# Patient Record
Sex: FEMALE | Race: WHITE | Employment: FULL TIME | URBAN - NONMETROPOLITAN AREA
[De-identification: names, ages, dates, MRNs, and addresses within clinical notes are randomized per-mention and may not be internally consistent; named-entity substitution may affect disease eponyms.]

---

## 2017-01-26 ENCOUNTER — OFFICE VISIT (OUTPATIENT)
Dept: FAMILY MEDICINE CLINIC | Age: 57
End: 2017-01-26

## 2017-01-26 ENCOUNTER — TELEPHONE (OUTPATIENT)
Dept: FAMILY MEDICINE CLINIC | Age: 57
End: 2017-01-26

## 2017-01-26 VITALS — BODY MASS INDEX: 32.52 KG/M2 | TEMPERATURE: 98.2 F | WEIGHT: 192.4 LBS

## 2017-01-26 DIAGNOSIS — J01.90 ACUTE RHINOSINUSITIS: Primary | ICD-10-CM

## 2017-01-26 PROCEDURE — 99213 OFFICE O/P EST LOW 20 MIN: CPT | Performed by: FAMILY MEDICINE

## 2017-01-26 RX ORDER — BENZONATATE 200 MG/1
200 CAPSULE ORAL 3 TIMES DAILY PRN
Qty: 30 CAPSULE | Refills: 0 | Status: SHIPPED | OUTPATIENT
Start: 2017-01-26 | End: 2017-02-05

## 2017-01-26 RX ORDER — AMOXICILLIN 500 MG/1
500 CAPSULE ORAL 2 TIMES DAILY
Qty: 20 CAPSULE | Refills: 0 | Status: SHIPPED | OUTPATIENT
Start: 2017-01-26 | End: 2017-12-05 | Stop reason: ALTCHOICE

## 2017-12-05 ENCOUNTER — OFFICE VISIT (OUTPATIENT)
Dept: FAMILY MEDICINE CLINIC | Age: 57
End: 2017-12-05

## 2017-12-05 ENCOUNTER — TELEPHONE (OUTPATIENT)
Dept: FAMILY MEDICINE CLINIC | Age: 57
End: 2017-12-05

## 2017-12-05 ENCOUNTER — HOSPITAL ENCOUNTER (OUTPATIENT)
Age: 57
Discharge: HOME | End: 2017-12-05

## 2017-12-05 VITALS
DIASTOLIC BLOOD PRESSURE: 86 MMHG | TEMPERATURE: 98 F | RESPIRATION RATE: 14 BRPM | HEIGHT: 64 IN | HEART RATE: 76 BPM | WEIGHT: 194.4 LBS | BODY MASS INDEX: 33.19 KG/M2 | SYSTOLIC BLOOD PRESSURE: 136 MMHG

## 2017-12-05 DIAGNOSIS — E04.9 THYROID ENLARGEMENT: Primary | ICD-10-CM

## 2017-12-05 DIAGNOSIS — R09.89 GLOBUS SENSATION: ICD-10-CM

## 2017-12-05 DIAGNOSIS — R53.83 OTHER FATIGUE: ICD-10-CM

## 2017-12-05 DIAGNOSIS — E04.9 THYROID ENLARGEMENT: ICD-10-CM

## 2017-12-05 LAB
T4 FREE: 0.83 NG/DL (ref 0.93–1.76)
TSH SERPL DL<=0.05 MIU/L-ACNC: 2.65 UIU/ML (ref 0.4–4.2)

## 2017-12-05 PROCEDURE — 84439 ASSAY OF FREE THYROXINE: CPT

## 2017-12-05 PROCEDURE — 36415 COLL VENOUS BLD VENIPUNCTURE: CPT

## 2017-12-05 PROCEDURE — 84443 ASSAY THYROID STIM HORMONE: CPT

## 2017-12-05 PROCEDURE — 99213 OFFICE O/P EST LOW 20 MIN: CPT | Performed by: FAMILY MEDICINE

## 2017-12-05 RX ORDER — RANITIDINE 150 MG/1
150 CAPSULE ORAL PRN
COMMUNITY

## 2017-12-05 ASSESSMENT — PATIENT HEALTH QUESTIONNAIRE - PHQ9
1. LITTLE INTEREST OR PLEASURE IN DOING THINGS: 0
2. FEELING DOWN, DEPRESSED OR HOPELESS: 0
SUM OF ALL RESPONSES TO PHQ QUESTIONS 1-9: 0
SUM OF ALL RESPONSES TO PHQ9 QUESTIONS 1 & 2: 0

## 2017-12-05 NOTE — TELEPHONE ENCOUNTER
Pt scheduled for US thyroid at [facility] on 12/12/17 at 8:30 am, pt to be in main radiology at 8:00 am.  Pt's , Dulce Maria Farmer, informed.   (on signed HIPAA)

## 2017-12-05 NOTE — PROGRESS NOTES
Visit Information    Have you changed or started any medications since your last visit including any over-the-counter medicines, vitamins, or herbal medicines? no   Are you having any side effects from any of your medications? -  no  Have you stopped taking any of your medications? Is so, why? -  no    Have you seen any other physician or provider since your last visit? No  Have you had any other diagnostic tests since your last visit? No  Have you been seen in the emergency room and/or had an admission to a hospital since we last saw you? No  Have you had your routine dental cleaning in the past 6 months? no    Have you activated your [employer] account? If not, what are your barriers?  No     Patient Care Team:  Negro Emanuel DO as PCP - General (Family Medicine)        Health Maintenance   Topic Date Due    Hepatitis C screen  1960    HIV screen  07/13/1976    DTaP/Tdap/Td vaccine (1 - Tdap) 07/13/1980    Cervical cancer screen  07/13/1982    Lipid screen  07/13/2001    Breast cancer screen  07/13/2011    Colon cancer screen colonoscopy  07/13/2011    Flu vaccine (1) 09/01/2017

## 2017-12-05 NOTE — PROGRESS NOTES
Clifford Murrieta is a 65 y.o. female that presents for Thyroid Problem (pt states her mom has a history of thyroid issues, pt noticed her neck swelling in the last month, thisis getting worse.) and Heartburn        HPI:    States that she is noting thyroid enlargement for the past 2 months. States that this comes and goes. Notes that she is having weight gain with this as well. Noting some hoarseness. States that she notes pressure in her throat as well. Notes some intermittent pain in the neck as well. Also noting heartburn over that time as well. She is taking zantac BID which does help. Notes that she is having some diffuse hair loss as well. She states that she feels like food is getting stuck in her chest at times. Can occur with any solids, does not occur with fluids. Noting more constipation. Mother with a history of hypothyroidism. Health Maintenance   Topic Date Due    Hepatitis C screen  1960    HIV screen  07/13/1976    DTaP/Tdap/Td vaccine (1 - Tdap) 07/13/1980    Cervical cancer screen  07/13/1982    Lipid screen  07/13/2001    Breast cancer screen  07/13/2011    Colon cancer screen colonoscopy  07/13/2011    Flu vaccine (1) 09/01/2017       History reviewed. No pertinent past medical history. Past Surgical History:   Procedure Laterality Date    CYST REMOVAL      cyst removal from hand        Social History   Substance Use Topics    Smoking status: Never Smoker    Smokeless tobacco: Never Used    Alcohol use No       Family History   Problem Relation Age of Onset    Diabetes Mother     High Blood Pressure Mother     Heart Failure Father     Depression Father     Heart Disease Father     High Blood Pressure Father     High Cholesterol Father     Stroke Father          I have reviewed the patient's past medical history, past surgical history, allergies, medications, social and family history and I have made updates where appropriate.     ROS        PHYSICAL EXAM:  /86   Pulse 76   Temp 98 °F (36.7 °C) (Oral)   Resp 14   Ht 5' 4.49\" (1.638 m)   Wt 194 lb 6.4 oz (88.2 kg)   BMI 32.87 kg/m²     General Appearance: well developed and well- nourished, in no acute distress  Head: normocephalic and atraumatic  ENT: external ear and ear canal normal bilaterally, nose without deformity, nasal mucosa and turbinates normal without polyps, oropharynx normal, dentition is normal for age, no lip or gum lesions noted  Neck: supple and non-tender without mass, appears to have diffuse thyroid enlargement and tenderness, no cervical lymphadenopathy  Pulmonary/Chest: clear to auscultation bilaterally- no wheezes, rales or rhonchi, normal air movement, no respiratory distress or retractions  Cardiovascular: normal rate, regular rhythm, normal S1 and S2, no murmurs, rubs, clicks, or gallops, distal pulses intact  Abdomen: soft, non-tender, non-distended, no rebound or guarding, no masses or hernias noted, no hepatospleenomegaly  Extremities: no cyanosis, clubbing or edema of the lower extremities  Psych:  Normal affect without evidence of depression or anxiety, insight and judgement are appropriate, memory appears intact  Skin: warm and dry, no rash or erythema      ASSESSMENT & PLAN  Lanette was seen today for thyroid problem and heartburn. Diagnoses and all orders for this visit:    Thyroid enlargement  -     US THYROID; Future  -     TSH without Reflex; Future  -     T4, Free; Future    Other fatigue  -     US THYROID; Future  -     TSH without Reflex; Future  -     T4, Free; Future    Globus sensation  -     US THYROID; Future  -     TSH without Reflex; Future  -     T4, Free; Future    Other orders  -     Cancel: TSH with Reflex; Future    -Suspect hypothyroidism with possible goiter, will obtain labs and obtain US, will call with results  -Globus sensation sounds like a stricture.   Given that she does not have insurance, she wants to hold off on GI referral and work on

## 2017-12-12 ENCOUNTER — HOSPITAL ENCOUNTER (OUTPATIENT)
Dept: ULTRASOUND IMAGING | Age: 57
Discharge: HOME | End: 2017-12-12

## 2017-12-12 DIAGNOSIS — E04.9 THYROID ENLARGEMENT: ICD-10-CM

## 2017-12-12 DIAGNOSIS — R53.83 OTHER FATIGUE: ICD-10-CM

## 2017-12-12 DIAGNOSIS — R09.89 GLOBUS SENSATION: ICD-10-CM

## 2017-12-12 PROCEDURE — 76536 US EXAM OF HEAD AND NECK: CPT

## 2017-12-13 ENCOUNTER — TELEPHONE (OUTPATIENT)
Dept: FAMILY MEDICINE CLINIC | Age: 57
End: 2017-12-13

## 2017-12-13 DIAGNOSIS — R79.89 LOW SERUM T4 LEVEL: ICD-10-CM

## 2017-12-13 DIAGNOSIS — E04.9 GOITER, NODULAR: Primary | ICD-10-CM

## 2017-12-13 RX ORDER — LEVOTHYROXINE SODIUM 0.03 MG/1
25 TABLET ORAL DAILY
Qty: 30 TABLET | Refills: 3 | Status: SHIPPED | OUTPATIENT
Start: 2017-12-13 | End: 2018-01-11 | Stop reason: SDUPTHER

## 2018-01-09 ENCOUNTER — HOSPITAL ENCOUNTER (OUTPATIENT)
Age: 58
Discharge: HOME | End: 2018-01-09

## 2018-01-09 ENCOUNTER — TELEPHONE (OUTPATIENT)
Dept: FAMILY MEDICINE CLINIC | Age: 58
End: 2018-01-09

## 2018-01-09 DIAGNOSIS — R79.89 LOW SERUM T4 LEVEL: ICD-10-CM

## 2018-01-09 DIAGNOSIS — E04.9 GOITER, NODULAR: ICD-10-CM

## 2018-01-09 LAB
T4 FREE: 1 NG/DL (ref 0.93–1.76)
TSH SERPL DL<=0.05 MIU/L-ACNC: 1.77 UIU/ML (ref 0.4–4.2)

## 2018-01-09 PROCEDURE — 84439 ASSAY OF FREE THYROXINE: CPT

## 2018-01-09 PROCEDURE — 84443 ASSAY THYROID STIM HORMONE: CPT

## 2018-01-09 PROCEDURE — 36415 COLL VENOUS BLD VENIPUNCTURE: CPT

## 2018-01-11 DIAGNOSIS — E04.9 GOITER, NODULAR: ICD-10-CM

## 2018-01-11 DIAGNOSIS — R79.89 LOW SERUM T4 LEVEL: ICD-10-CM

## 2018-01-11 RX ORDER — LEVOTHYROXINE SODIUM 0.03 MG/1
25 TABLET ORAL DAILY
Qty: 90 TABLET | Refills: 3 | Status: SHIPPED | OUTPATIENT
Start: 2018-01-11 | End: 2019-07-09

## 2018-01-16 ENCOUNTER — OFFICE VISIT (OUTPATIENT)
Dept: FAMILY MEDICINE CLINIC | Age: 58
End: 2018-01-16

## 2018-01-16 VITALS
TEMPERATURE: 98.5 F | DIASTOLIC BLOOD PRESSURE: 76 MMHG | WEIGHT: 190 LBS | BODY MASS INDEX: 31.65 KG/M2 | HEART RATE: 74 BPM | SYSTOLIC BLOOD PRESSURE: 130 MMHG | RESPIRATION RATE: 18 BRPM | HEIGHT: 65 IN

## 2018-01-16 DIAGNOSIS — R05.9 COUGH: ICD-10-CM

## 2018-01-16 DIAGNOSIS — J10.1 INFLUENZA A: Primary | ICD-10-CM

## 2018-01-16 DIAGNOSIS — R52 BODY ACHES: ICD-10-CM

## 2018-01-16 LAB
INFLUENZA A ANTIBODY: POSITIVE
INFLUENZA B ANTIBODY: NEGATIVE

## 2018-01-16 PROCEDURE — 87804 INFLUENZA ASSAY W/OPTIC: CPT | Performed by: FAMILY MEDICINE

## 2018-01-16 PROCEDURE — 99213 OFFICE O/P EST LOW 20 MIN: CPT | Performed by: FAMILY MEDICINE

## 2018-01-16 RX ORDER — BENZONATATE 200 MG/1
200 CAPSULE ORAL 3 TIMES DAILY PRN
Qty: 30 CAPSULE | Refills: 0 | Status: SHIPPED | OUTPATIENT
Start: 2018-01-16 | End: 2018-01-26

## 2018-01-16 NOTE — PROGRESS NOTES
Visit Information    Have you changed or started any medications since your last visit including any over-the-counter medicines, vitamins, or herbal medicines? no   Are you having any side effects from any of your medications? -  no  Have you stopped taking any of your medications? Is so, why? -  no    Have you seen any other physician or provider since your last visit? No  Have you had any other diagnostic tests since your last visit? No  Have you been seen in the emergency room and/or had an admission to a hospital since we last saw you? No  Have you had your routine dental cleaning in the past 6 months? no    Have you activated your [employer] account? If not, what are your barriers?  Yes     Patient Care Team:  Wili Brush DO as PCP - General (Family Medicine)        Health Maintenance   Topic Date Due    Hepatitis C screen  1960    HIV screen  07/13/1976    DTaP/Tdap/Td vaccine (1 - Tdap) 07/13/1980    Cervical cancer screen  07/13/1982    Lipid screen  07/13/2001    Diabetes screen  07/13/2001    Breast cancer screen  07/13/2011    Colon cancer screen colonoscopy  07/13/2011    Flu vaccine (1) 09/01/2017

## 2018-02-13 ENCOUNTER — OFFICE VISIT (OUTPATIENT)
Dept: FAMILY MEDICINE CLINIC | Age: 58
End: 2018-02-13

## 2018-02-13 VITALS
HEART RATE: 80 BPM | HEIGHT: 65 IN | SYSTOLIC BLOOD PRESSURE: 116 MMHG | RESPIRATION RATE: 24 BRPM | BODY MASS INDEX: 31.32 KG/M2 | WEIGHT: 188 LBS | TEMPERATURE: 98 F | DIASTOLIC BLOOD PRESSURE: 70 MMHG

## 2018-02-13 DIAGNOSIS — R05.8 POST-VIRAL COUGH SYNDROME: Primary | ICD-10-CM

## 2018-02-13 PROCEDURE — 99213 OFFICE O/P EST LOW 20 MIN: CPT | Performed by: FAMILY MEDICINE

## 2018-02-13 RX ORDER — GUAIFENESIN AND CODEINE PHOSPHATE 100; 10 MG/5ML; MG/5ML
5 SOLUTION ORAL EVERY 4 HOURS PRN
Qty: 200 ML | Refills: 0 | Status: SHIPPED | OUTPATIENT
Start: 2018-02-13 | End: 2018-02-20

## 2018-02-13 RX ORDER — PREDNISONE 20 MG/1
20 TABLET ORAL DAILY
Qty: 5 TABLET | Refills: 0 | Status: SHIPPED | OUTPATIENT
Start: 2018-02-13 | End: 2018-02-18

## 2018-02-13 NOTE — PROGRESS NOTES
Visit Information    Have you changed or started any medications since your last visit including any over-the-counter medicines, vitamins, or herbal medicines? no   Are you having any side effects from any of your medications? -  no  Have you stopped taking any of your medications? Is so, why? -  no    Have you seen any other physician or provider since your last visit? No  Have you had any other diagnostic tests since your last visit? No  Have you been seen in the emergency room and/or had an admission to a hospital since we last saw you? No  Have you had your routine dental cleaning in the past 6 months? no    Have you activated your [employer] account? If not, what are your barriers?  Yes     Patient Care Team:  Kenyon Angel, DO as PCP - General (Family Medicine)        Health Maintenance   Topic Date Due    Hepatitis C screen  1960    HIV screen  07/13/1976    DTaP/Tdap/Td vaccine (1 - Tdap) 07/13/1980    Cervical cancer screen  07/13/1982    Lipid screen  07/13/2001    Diabetes screen  07/13/2001    Breast cancer screen  07/13/2011    Colon cancer screen colonoscopy  07/13/2011    Flu vaccine (1) 09/01/2017

## 2018-06-11 ENCOUNTER — TELEPHONE (OUTPATIENT)
Dept: FAMILY MEDICINE CLINIC | Age: 58
End: 2018-06-11

## 2018-06-11 DIAGNOSIS — E04.2 MULTINODULAR GOITER: Primary | ICD-10-CM

## 2018-06-26 ENCOUNTER — HOSPITAL ENCOUNTER (OUTPATIENT)
Dept: ULTRASOUND IMAGING | Age: 58
Discharge: HOME | End: 2018-06-26

## 2018-06-26 ENCOUNTER — TELEPHONE (OUTPATIENT)
Dept: FAMILY MEDICINE CLINIC | Age: 58
End: 2018-06-26

## 2018-06-26 DIAGNOSIS — E04.2 MULTINODULAR GOITER: ICD-10-CM

## 2018-06-26 DIAGNOSIS — E04.1 THYROID NODULE: Primary | ICD-10-CM

## 2018-06-26 PROCEDURE — 76536 US EXAM OF HEAD AND NECK: CPT

## 2018-06-27 ENCOUNTER — TELEPHONE (OUTPATIENT)
Dept: FAMILY MEDICINE CLINIC | Age: 58
End: 2018-06-27

## 2018-06-28 ENCOUNTER — TELEPHONE (OUTPATIENT)
Dept: FAMILY MEDICINE CLINIC | Age: 58
End: 2018-06-28

## 2018-07-19 ENCOUNTER — OFFICE VISIT (OUTPATIENT)
Dept: FAMILY MEDICINE CLINIC | Age: 58
End: 2018-07-19

## 2018-07-19 VITALS
WEIGHT: 186 LBS | DIASTOLIC BLOOD PRESSURE: 84 MMHG | HEART RATE: 66 BPM | HEIGHT: 64 IN | SYSTOLIC BLOOD PRESSURE: 136 MMHG | BODY MASS INDEX: 31.76 KG/M2 | RESPIRATION RATE: 18 BRPM | TEMPERATURE: 99.5 F

## 2018-07-19 DIAGNOSIS — J01.90 ACUTE RHINOSINUSITIS: Primary | ICD-10-CM

## 2018-07-19 PROCEDURE — 99213 OFFICE O/P EST LOW 20 MIN: CPT | Performed by: FAMILY MEDICINE

## 2018-07-19 RX ORDER — DOXYCYCLINE HYCLATE 100 MG/1
100 CAPSULE ORAL 2 TIMES DAILY
Qty: 20 CAPSULE | Refills: 0 | Status: SHIPPED | OUTPATIENT
Start: 2018-07-19 | End: 2018-07-29

## 2018-07-19 RX ORDER — BENZONATATE 100 MG/1
CAPSULE ORAL
Qty: 60 CAPSULE | Refills: 0 | Status: SHIPPED | OUTPATIENT
Start: 2018-07-19 | End: 2019-03-08 | Stop reason: SDUPTHER

## 2018-07-19 RX ORDER — FLUTICASONE PROPIONATE 50 MCG
1 SPRAY, SUSPENSION (ML) NASAL DAILY
Qty: 1 BOTTLE | Refills: 0 | Status: SHIPPED | OUTPATIENT
Start: 2018-07-19 | End: 2018-10-12

## 2018-07-19 NOTE — PATIENT INSTRUCTIONS
You may receive a survey about your visit with us today. The feedback from our patients helps us identify what is working well and where the service to all patients can be enhanced. Thank you! Patient Education        Sinusitis: Care Instructions  Your Care Instructions    Sinusitis is an infection of the lining of the sinus cavities in your head. Sinusitis often follows a cold. It causes pain and pressure in your head and face. In most cases, sinusitis gets better on its own in 1 to 2 weeks. But some mild symptoms may last for several weeks. Sometimes antibiotics are needed. Follow-up care is a key part of your treatment and safety. Be sure to make and go to all appointments, and call your doctor if you are having problems. It's also a good idea to know your test results and keep a list of the medicines you take. How can you care for yourself at home? · Take an over-the-counter pain medicine, such as acetaminophen (Tylenol), ibuprofen (Advil, Motrin), or naproxen (Aleve). Read and follow all instructions on the label. · If the doctor prescribed antibiotics, take them as directed. Do not stop taking them just because you feel better. You need to take the full course of antibiotics. · Be careful when taking over-the-counter cold or flu medicines and Tylenol at the same time. Many of these medicines have acetaminophen, which is Tylenol. Read the labels to make sure that you are not taking more than the recommended dose. Too much acetaminophen (Tylenol) can be harmful. · Breathe warm, moist air from a steamy shower, a hot bath, or a sink filled with hot water. Avoid cold, dry air. Using a humidifier in your home may help. Follow the directions for cleaning the machine. · Use saline (saltwater) nasal washes to help keep your nasal passages open and wash out mucus and bacteria. You can buy saline nose drops at a grocery store or drugstore.  Or you can make your own at home by adding 1 teaspoon of salt and 1 teaspoon of baking soda to 2 cups of distilled water. If you make your own, fill a bulb syringe with the solution, insert the tip into your nostril, and squeeze gently. [place] Saver your nose. · Put a hot, wet towel or a warm gel pack on your face 3 or 4 times a day for 5 to 10 minutes each time. · Try a decongestant nasal spray like oxymetazoline (Afrin). Do not use it for more than 3 days in a row. Using it for more than 3 days can make your congestion worse. When should you call for help? Call your doctor now or seek immediate medical care if:    · You have new or worse swelling or redness in your face or around your eyes.     · You have a new or higher fever.    Watch closely for changes in your health, and be sure to contact your doctor if:    · You have new or worse facial pain.     · The mucus from your nose becomes thicker (like pus) or has new blood in it.     · You are not getting better as expected. Where can you learn more? Go to https://Picklive.Aula 7. org and sign in to your [facility] account. Enter P639 in the Hydrostor box to learn more about \"Sinusitis: Care Instructions. \"     If you do not have an account, please click on the \"Sign Up Now\" link. Current as of: May 12, 2017  Content Version: 11.6  © 8044-7357 [employer]. Care instructions adapted under license by [facility] ([facility]). If you have questions about a medical condition or this instruction, always ask your healthcare professional. [place] [address] any warranty or liability for your use of this information.

## 2018-07-19 NOTE — PROGRESS NOTES
Chief Complaint   Patient presents with    Cough    Other     lost voice  x 2 days       History obtained from the patient. SUBJECTIVE:  Devendra Duran is a 63 y.o. female that presents today for      URI type sxs: 7 days of cough, congestion, cough. No fevers. No SOB. Fever - No  Runny nose or congestion -  Yes   Cough -  Yes  Sore throat -  Yes  Headache, fatigue, joint pains, muscle aches -  No  Double Sickening - Yes  Shortness of breath/Wheezing? -  No  Nausea/Vomiting/Diarrhea? No  Sick contacts - Yes  Maxillary Tooth Pain -  Yes  Treatment tried and response - otc meds, no better        Current Outpatient Prescriptions   Medication Sig Dispense Refill    doxycycline hyclate (VIBRAMYCIN) 100 MG capsule Take 1 capsule by mouth 2 times daily for 10 days 20 capsule 0    benzonatate (TESSALON PERLES) 100 MG capsule Take 1 to 2 tablets by mouth every 8 hours as needed for cough. 60 capsule 0    fluticasone (FLONASE) 50 MCG/ACT nasal spray 1 spray by Nasal route daily for 14 days 1 Bottle 0    levothyroxine (SYNTHROID) 25 MCG tablet Take 1 tablet by mouth Daily 90 tablet 3    ranitidine (ZANTAC) 150 MG capsule Take 150 mg by mouth 2 times daily       No current facility-administered medications for this visit. Orders Placed This Encounter   Medications    doxycycline hyclate (VIBRAMYCIN) 100 MG capsule     Sig: Take 1 capsule by mouth 2 times daily for 10 days     Dispense:  20 capsule     Refill:  0    benzonatate (TESSALON PERLES) 100 MG capsule     Sig: Take 1 to 2 tablets by mouth every 8 hours as needed for cough. Dispense:  60 capsule     Refill:  0    fluticasone (FLONASE) 50 MCG/ACT nasal spray     Si spray by Nasal route daily for 14 days     Dispense:  1 Bottle     Refill:  0         All medications reviewed and reconciled, including OTC and herbal medications. Updated list given to patient.        Patient Active Problem List    Diagnosis Date Noted    Goiter, nodular

## 2018-08-10 ENCOUNTER — HOSPITAL ENCOUNTER (OUTPATIENT)
Dept: ULTRASOUND IMAGING | Age: 58
Discharge: HOME | End: 2018-08-10

## 2018-08-10 DIAGNOSIS — E04.1 THYROID NODULE: ICD-10-CM

## 2018-08-10 PROCEDURE — 76942 ECHO GUIDE FOR BIOPSY: CPT

## 2018-08-10 PROCEDURE — 60100 BIOPSY OF THYROID: CPT

## 2018-08-10 PROCEDURE — 88173 CYTOPATH EVAL FNA REPORT: CPT

## 2018-08-10 PROCEDURE — 88172 CYTP DX EVAL FNA 1ST EA SITE: CPT

## 2018-08-10 NOTE — PROGRESS NOTES
Formulation and discussion of sedation / procedure plans, risks, benefits, side effects and alternatives with patient and/or responsible adult completed.     Electronically signed by Alana Carrillo MD on 8/10/2018 at 3:07 PM

## 2018-08-14 ENCOUNTER — TELEPHONE (OUTPATIENT)
Dept: FAMILY MEDICINE CLINIC | Age: 58
End: 2018-08-14

## 2018-08-14 DIAGNOSIS — E06.3 LYMPHOCYTIC THYROIDITIS: Primary | ICD-10-CM

## 2018-08-14 NOTE — TELEPHONE ENCOUNTER
Lanette called for her biopsy results. She is getting ready to leave for work soon so if she is not available she asked that you call her home phone # and talk to her  Laron Jj.   [phone]

## 2018-10-12 ENCOUNTER — HOSPITAL ENCOUNTER (OUTPATIENT)
Dept: NURSING | Age: 58
Discharge: HOME | End: 2018-10-12

## 2018-10-12 VITALS
RESPIRATION RATE: 16 BRPM | TEMPERATURE: 97.9 F | SYSTOLIC BLOOD PRESSURE: 129 MMHG | DIASTOLIC BLOOD PRESSURE: 78 MMHG | HEART RATE: 60 BPM | BODY MASS INDEX: 31.93 KG/M2 | WEIGHT: 186 LBS

## 2018-10-12 LAB
ALBUMIN SERPL-MCNC: 4.3 G/DL (ref 3.5–5.1)
ALP BLD-CCNC: 106 U/L (ref 38–126)
ALT SERPL-CCNC: 9 U/L (ref 11–66)
ANION GAP SERPL CALCULATED.3IONS-SCNC: 15 MEQ/L (ref 8–16)
AST SERPL-CCNC: 17 U/L (ref 5–40)
AVERAGE GLUCOSE: 105 MG/DL (ref 70–126)
BASOPHILS # BLD: 1.4 %
BASOPHILS ABSOLUTE: 0.1 THOU/MM3 (ref 0–0.1)
BILIRUB SERPL-MCNC: 0.2 MG/DL (ref 0.3–1.2)
BUN BLDV-MCNC: 10 MG/DL (ref 7–22)
CALCIUM SERPL-MCNC: 9.4 MG/DL (ref 8.5–10.5)
CHLORIDE BLD-SCNC: 104 MEQ/L (ref 98–111)
CO2: 22 MEQ/L (ref 23–33)
CORTISOL COLLECTION INFO: NORMAL
CORTISOL: 11.28 UG/DL
CORTISOL: 20.04 UG/DL
CORTISOL: 20.48 UG/DL
CREAT SERPL-MCNC: 0.8 MG/DL (ref 0.4–1.2)
EOSINOPHIL # BLD: 4.8 %
EOSINOPHILS ABSOLUTE: 0.2 THOU/MM3 (ref 0–0.4)
ERYTHROCYTE [DISTWIDTH] IN BLOOD BY AUTOMATED COUNT: 15.9 % (ref 11.5–14.5)
ERYTHROCYTE [DISTWIDTH] IN BLOOD BY AUTOMATED COUNT: 43.1 FL (ref 35–45)
FOLLICLE STIMULATING HORMONE: 39 MIU/ML (ref 16–160)
GFR SERPL CREATININE-BSD FRML MDRD: 74 ML/MIN/1.73M2
GLUCOSE BLD-MCNC: 95 MG/DL (ref 70–108)
HBA1C MFR BLD: 5.5 % (ref 4.4–6.4)
HCT VFR BLD CALC: 32.6 % (ref 37–47)
HEMOGLOBIN: 10.2 GM/DL (ref 12–16)
IMMATURE GRANS (ABS): 0.01 THOU/MM3 (ref 0–0.07)
IMMATURE GRANULOCYTES: 0.2 %
LUTEINIZING HORMONE: 50.9 MIU/ML (ref 3.3–70.6)
LYMPHOCYTES # BLD: 35.3 %
LYMPHOCYTES ABSOLUTE: 1.8 THOU/MM3 (ref 1–4.8)
MCH RBC QN AUTO: 23.4 PG (ref 26–33)
MCHC RBC AUTO-ENTMCNC: 31.3 GM/DL (ref 32.2–35.5)
MCV RBC AUTO: 74.8 FL (ref 81–99)
MONOCYTES # BLD: 14.9 %
MONOCYTES ABSOLUTE: 0.8 THOU/MM3 (ref 0.4–1.3)
NUCLEATED RED BLOOD CELLS: 0 /100 WBC
PLATELET # BLD: 326 THOU/MM3 (ref 130–400)
PMV BLD AUTO: 8.6 FL (ref 9.4–12.4)
POTASSIUM SERPL-SCNC: 4.4 MEQ/L (ref 3.5–5.2)
PROLACTIN: 11 NG/ML
RBC # BLD: 4.36 MILL/MM3 (ref 4.2–5.4)
SEG NEUTROPHILS: 43.4 %
SEGMENTED NEUTROPHILS ABSOLUTE COUNT: 2.3 THOU/MM3 (ref 1.8–7.7)
SODIUM BLD-SCNC: 141 MEQ/L (ref 135–145)
TOTAL PROTEIN: 7.1 G/DL (ref 6.1–8)
TSH SERPL DL<=0.05 MIU/L-ACNC: 3.8 UIU/ML (ref 0.4–4.2)
VITAMIN B-12: 177 PG/ML (ref 211–911)
VITAMIN D 25-HYDROXY: 10 NG/ML (ref 30–100)
WBC # BLD: 5.2 THOU/MM3 (ref 4.8–10.8)

## 2018-10-12 PROCEDURE — 82607 VITAMIN B-12: CPT

## 2018-10-12 PROCEDURE — 84146 ASSAY OF PROLACTIN: CPT

## 2018-10-12 PROCEDURE — 83002 ASSAY OF GONADOTROPIN (LH): CPT

## 2018-10-12 PROCEDURE — 82533 TOTAL CORTISOL: CPT

## 2018-10-12 PROCEDURE — 83001 ASSAY OF GONADOTROPIN (FSH): CPT

## 2018-10-12 PROCEDURE — 84443 ASSAY THYROID STIM HORMONE: CPT

## 2018-10-12 PROCEDURE — 80053 COMPREHEN METABOLIC PANEL: CPT

## 2018-10-12 PROCEDURE — 36415 COLL VENOUS BLD VENIPUNCTURE: CPT

## 2018-10-12 PROCEDURE — 6360000002 HC RX W HCPCS: Performed by: INTERNAL MEDICINE

## 2018-10-12 PROCEDURE — 2580000003 HC RX 258: Performed by: INTERNAL MEDICINE

## 2018-10-12 PROCEDURE — 2709999900 HC NON-CHARGEABLE SUPPLY

## 2018-10-12 PROCEDURE — 84305 ASSAY OF SOMATOMEDIN: CPT

## 2018-10-12 PROCEDURE — G0463 HOSPITAL OUTPT CLINIC VISIT: HCPCS

## 2018-10-12 PROCEDURE — 96374 THER/PROPH/DIAG INJ IV PUSH: CPT

## 2018-10-12 PROCEDURE — 82306 VITAMIN D 25 HYDROXY: CPT

## 2018-10-12 PROCEDURE — 85025 COMPLETE CBC W/AUTO DIFF WBC: CPT

## 2018-10-12 PROCEDURE — 82024 ASSAY OF ACTH: CPT

## 2018-10-12 PROCEDURE — 83036 HEMOGLOBIN GLYCOSYLATED A1C: CPT

## 2018-10-12 RX ORDER — COSYNTROPIN 0.25 MG/ML
250 INJECTION, POWDER, FOR SOLUTION INTRAMUSCULAR; INTRAVENOUS ONCE
Status: COMPLETED | OUTPATIENT
Start: 2018-10-12 | End: 2018-10-12

## 2018-10-12 RX ORDER — SODIUM CHLORIDE 0.9 % (FLUSH) 0.9 %
10 SYRINGE (ML) INJECTION PRN
Status: DISCONTINUED | OUTPATIENT
Start: 2018-10-12 | End: 2018-10-13 | Stop reason: HOSPADM

## 2018-10-12 RX ADMIN — COSYNTROPIN 250 MCG: 0.25 INJECTION, POWDER, LYOPHILIZED, FOR SOLUTION INTRAMUSCULAR; INTRAVENOUS at 08:06

## 2018-10-12 RX ADMIN — Medication 10 ML: at 08:36

## 2018-10-12 RX ADMIN — Medication 10 ML: at 09:08

## 2018-10-12 RX ADMIN — Medication 10 ML: at 08:03

## 2018-10-12 ASSESSMENT — PAIN - FUNCTIONAL ASSESSMENT: PAIN_FUNCTIONAL_ASSESSMENT: 0-10

## 2018-10-12 NOTE — PROGRESS NOTES
0730 Patient arrived to [facility] ambulatory for cortrosyn stimulation test with  at side  PT RIGHTS AND RESPONSIBILITIES OFFERED TO PT.  5170 Test explained to patient and  along with education of medication and lab work. 8314 Lab collected and sent to lab as ordered. 0825 Lab collected 30 minutes post injection. 4435 Lab work 60 min post injection collected and sent to lab as ordered  [phone] Discharge instructions given to patient verbalized understanding. Patient discharged to home in stable condition.     _m___ Safety:       (Environmental)   [place] to environment   Ensure ID band is correct and in place/ allergy band as needed   Assess for fall risk   Initiate fall precautions as applicable (fall band, side rails, etc.)   Call light within reach   Bed in low position/ wheels locked    _m___ Pain:        Assess pain level and characteristics   Administer analgesics as ordered   Assess effectiveness of pain management and report to MD as needed    _m___ Knowledge Deficit:   Assess baseline knowledge   Provide teaching at level of understanding   Provide teaching via preferred learning method   Evaluate teaching effectiveness  m  ____ Hemodynamic/Respiratory Status:       (Pre and Post Procedure Monitoring)   Assess/Monitor vital signs and LOC   Assess Baseline SpO2 prior to any sedation   Obtain weight/height   Assess vital signs/ LOC until patient meets discharge criteria   Monitor procedure site and notify MD of any issues

## 2018-10-14 LAB
ACTH: 75 PG/ML (ref 6–58)
SOMATOMEDIN IGF 1 Z-SCORE: 1
SOMATOMEDIN: 175 NG/ML (ref 47–236)

## 2019-01-22 ENCOUNTER — OFFICE VISIT (OUTPATIENT)
Dept: FAMILY MEDICINE CLINIC | Age: 59
End: 2019-01-22

## 2019-01-22 VITALS
HEART RATE: 88 BPM | HEIGHT: 66 IN | RESPIRATION RATE: 18 BRPM | TEMPERATURE: 98.3 F | WEIGHT: 185.4 LBS | SYSTOLIC BLOOD PRESSURE: 152 MMHG | DIASTOLIC BLOOD PRESSURE: 94 MMHG | BODY MASS INDEX: 29.8 KG/M2

## 2019-01-22 DIAGNOSIS — E04.9 GOITER, NODULAR: ICD-10-CM

## 2019-01-22 DIAGNOSIS — E55.9 VITAMIN D DEFICIENCY: ICD-10-CM

## 2019-01-22 DIAGNOSIS — E03.8 OTHER SPECIFIED HYPOTHYROIDISM: Primary | ICD-10-CM

## 2019-01-22 DIAGNOSIS — E53.9 VITAMIN B DEFICIENCY: ICD-10-CM

## 2019-01-22 PROCEDURE — 99213 OFFICE O/P EST LOW 20 MIN: CPT | Performed by: FAMILY MEDICINE

## 2019-01-22 RX ORDER — ERGOCALCIFEROL 1.25 MG/1
CAPSULE ORAL
Refills: 0 | COMMUNITY
Start: 2018-11-06 | End: 2019-01-29

## 2019-01-22 ASSESSMENT — PATIENT HEALTH QUESTIONNAIRE - PHQ9
SUM OF ALL RESPONSES TO PHQ9 QUESTIONS 1 & 2: 1
2. FEELING DOWN, DEPRESSED OR HOPELESS: 1
SUM OF ALL RESPONSES TO PHQ QUESTIONS 1-9: 1
SUM OF ALL RESPONSES TO PHQ QUESTIONS 1-9: 1
1. LITTLE INTEREST OR PLEASURE IN DOING THINGS: 0

## 2019-01-29 ENCOUNTER — HOSPITAL ENCOUNTER (OUTPATIENT)
Age: 59
Discharge: HOME | End: 2019-01-29

## 2019-01-29 ENCOUNTER — TELEPHONE (OUTPATIENT)
Dept: FAMILY MEDICINE CLINIC | Age: 59
End: 2019-01-29

## 2019-01-29 DIAGNOSIS — E55.9 VITAMIN D DEFICIENCY: ICD-10-CM

## 2019-01-29 DIAGNOSIS — E53.9 VITAMIN B DEFICIENCY: ICD-10-CM

## 2019-01-29 LAB
VITAMIN B-12: 1987 PG/ML (ref 211–911)
VITAMIN D 25-HYDROXY: 32 NG/ML (ref 30–100)

## 2019-01-29 PROCEDURE — 82607 VITAMIN B-12: CPT

## 2019-01-29 PROCEDURE — 36415 COLL VENOUS BLD VENIPUNCTURE: CPT

## 2019-01-29 PROCEDURE — 82306 VITAMIN D 25 HYDROXY: CPT

## 2019-03-08 ENCOUNTER — TELEPHONE (OUTPATIENT)
Dept: FAMILY MEDICINE CLINIC | Age: 59
End: 2019-03-08

## 2019-03-08 ENCOUNTER — OFFICE VISIT (OUTPATIENT)
Dept: FAMILY MEDICINE CLINIC | Age: 59
End: 2019-03-08

## 2019-03-08 VITALS
BODY MASS INDEX: 29.82 KG/M2 | HEART RATE: 88 BPM | HEIGHT: 65 IN | SYSTOLIC BLOOD PRESSURE: 128 MMHG | RESPIRATION RATE: 14 BRPM | TEMPERATURE: 98.2 F | WEIGHT: 179 LBS | DIASTOLIC BLOOD PRESSURE: 64 MMHG

## 2019-03-08 DIAGNOSIS — J06.9 UPPER RESPIRATORY TRACT INFECTION, UNSPECIFIED TYPE: Primary | ICD-10-CM

## 2019-03-08 PROCEDURE — 99213 OFFICE O/P EST LOW 20 MIN: CPT | Performed by: FAMILY MEDICINE

## 2019-03-08 RX ORDER — BENZONATATE 100 MG/1
CAPSULE ORAL
Qty: 60 CAPSULE | Refills: 0 | Status: SHIPPED | OUTPATIENT
Start: 2019-03-08 | End: 2019-03-18

## 2019-03-08 RX ORDER — OSELTAMIVIR PHOSPHATE 75 MG/1
75 CAPSULE ORAL 2 TIMES DAILY
Qty: 10 CAPSULE | Refills: 0 | Status: SHIPPED | OUTPATIENT
Start: 2019-03-08 | End: 2019-03-13

## 2019-07-09 ENCOUNTER — NURSE ONLY (OUTPATIENT)
Dept: LAB | Age: 59
End: 2019-07-09

## 2019-07-09 ENCOUNTER — OFFICE VISIT (OUTPATIENT)
Dept: FAMILY MEDICINE CLINIC | Age: 59
End: 2019-07-09

## 2019-07-09 ENCOUNTER — TELEPHONE (OUTPATIENT)
Dept: FAMILY MEDICINE CLINIC | Age: 59
End: 2019-07-09

## 2019-07-09 VITALS
HEIGHT: 64 IN | HEART RATE: 84 BPM | WEIGHT: 177 LBS | TEMPERATURE: 98.3 F | BODY MASS INDEX: 30.22 KG/M2 | DIASTOLIC BLOOD PRESSURE: 80 MMHG | SYSTOLIC BLOOD PRESSURE: 136 MMHG | RESPIRATION RATE: 14 BRPM

## 2019-07-09 DIAGNOSIS — E04.9 GOITER, NODULAR: ICD-10-CM

## 2019-07-09 DIAGNOSIS — E03.8 OTHER SPECIFIED HYPOTHYROIDISM: Primary | ICD-10-CM

## 2019-07-09 DIAGNOSIS — E55.9 VITAMIN D DEFICIENCY: Primary | ICD-10-CM

## 2019-07-09 DIAGNOSIS — E55.9 VITAMIN D DEFICIENCY: ICD-10-CM

## 2019-07-09 DIAGNOSIS — R79.89 LOW SERUM T4 LEVEL: ICD-10-CM

## 2019-07-09 DIAGNOSIS — E03.8 OTHER SPECIFIED HYPOTHYROIDISM: ICD-10-CM

## 2019-07-09 LAB
T4 FREE: 0.97 NG/DL (ref 0.93–1.76)
TSH SERPL DL<=0.05 MIU/L-ACNC: 3.11 UIU/ML (ref 0.4–4.2)
VITAMIN D 25-HYDROXY: 22 NG/ML (ref 30–100)

## 2019-07-09 PROCEDURE — 99213 OFFICE O/P EST LOW 20 MIN: CPT | Performed by: FAMILY MEDICINE

## 2019-07-09 RX ORDER — CHOLECALCIFEROL (VITAMIN D3) 50 MCG
2000 TABLET ORAL DAILY
Qty: 30 TABLET | Refills: 5 | Status: SHIPPED | OUTPATIENT
Start: 2019-07-09 | End: 2019-10-24 | Stop reason: SDUPTHER

## 2019-07-09 RX ORDER — LEVOTHYROXINE SODIUM 0.03 MG/1
25 TABLET ORAL DAILY
Qty: 90 TABLET | Refills: 3 | Status: SHIPPED | OUTPATIENT
Start: 2019-07-09 | End: 2020-06-29

## 2019-10-24 DIAGNOSIS — E55.9 VITAMIN D DEFICIENCY: ICD-10-CM

## 2019-10-24 RX ORDER — CHOLECALCIFEROL (VITAMIN D3) 50 MCG
TABLET ORAL
Qty: 90 TABLET | Refills: 3 | Status: SHIPPED | OUTPATIENT
Start: 2019-10-24 | End: 2020-10-26

## 2020-06-29 RX ORDER — LEVOTHYROXINE SODIUM 0.03 MG/1
TABLET ORAL
Qty: 90 TABLET | Refills: 3 | Status: SHIPPED | OUTPATIENT
Start: 2020-06-29 | End: 2021-07-28

## 2020-07-21 ENCOUNTER — NURSE ONLY (OUTPATIENT)
Dept: LAB | Age: 60
End: 2020-07-21

## 2020-07-21 ENCOUNTER — OFFICE VISIT (OUTPATIENT)
Dept: FAMILY MEDICINE CLINIC | Age: 60
End: 2020-07-21

## 2020-07-21 VITALS
TEMPERATURE: 98.3 F | DIASTOLIC BLOOD PRESSURE: 70 MMHG | WEIGHT: 181 LBS | SYSTOLIC BLOOD PRESSURE: 128 MMHG | HEART RATE: 84 BPM | BODY MASS INDEX: 30.9 KG/M2 | OXYGEN SATURATION: 100 % | HEIGHT: 64 IN | RESPIRATION RATE: 10 BRPM

## 2020-07-21 LAB
T4 FREE: 1.09 NG/DL (ref 0.93–1.76)
TSH SERPL DL<=0.05 MIU/L-ACNC: 3.72 UIU/ML (ref 0.4–4.2)

## 2020-07-21 PROCEDURE — 99213 OFFICE O/P EST LOW 20 MIN: CPT | Performed by: FAMILY MEDICINE

## 2020-07-21 ASSESSMENT — PATIENT HEALTH QUESTIONNAIRE - PHQ9
SUM OF ALL RESPONSES TO PHQ QUESTIONS 1-9: 0
1. LITTLE INTEREST OR PLEASURE IN DOING THINGS: 0
SUM OF ALL RESPONSES TO PHQ9 QUESTIONS 1 & 2: 0
SUM OF ALL RESPONSES TO PHQ QUESTIONS 1-9: 0
2. FEELING DOWN, DEPRESSED OR HOPELESS: 0

## 2020-07-21 NOTE — PROGRESS NOTES
medical history, past surgical history, allergies, medications, social and family history and I have made updates where appropriate. Review of Systems        PHYSICAL EXAM:  /70   Pulse 84   Temp 98.3 °F (36.8 °C) (Oral)   Resp 10   Ht 5' 4\" (1.626 m)   Wt 181 lb (82.1 kg)   SpO2 100%   BMI 31.07 kg/m²     General Appearance: well developed and well- nourished, in no acute distress  Head: normocephalic and atraumatic  ENT: external ear and ear canal normal bilaterally, nose without deformity, nasal mucosa and turbinates normal without polyps, oropharynx normal, dentition is normal for age, no lipor gum lesions noted  Neck: supple and non-tender without mass, +bilateral thyroid nodules, no cervical lymphadenopathy  Pulmonary/Chest: clear to auscultation bilaterally- no wheezes, rales or rhonchi, normal air movement, no respiratory distress or retractions  Cardiovascular: normal rate, regular rhythm, normal S1 and S2, no murmurs, rubs, clicks, or gallops, distal pulses intact  Extremities: no cyanosis, clubbing or edema of the lower extremities  Psych:  Normal affect without evidence of depression oranxiety, insight and judgement are appropriate, memory appears intact  Skin: warm and dry, no rash or erythema      ASSESSMENT & PLAN  Lanette was seen today for other and other. Diagnoses and all orders for this visit:    Other specified hypothyroidism  -     T4, Free; Future  -     TSH without Reflex; Future    Dermatitis  -     hydrocortisone 2.5 % cream; Apply BID PRN         -Chronic issues stable, continue current medications  -Advised to call if any issues      Return in about 1 year (around 7/21/2021). Controlled Substances Monitoring:                     Lanette received counseling on the following healthy behaviors: medication adherence  Reviewed prior labs and health maintenance. Continue current medications, diet and exercise.   Discussed use, benefit, and side effects of prescribed

## 2020-10-26 RX ORDER — CHOLECALCIFEROL (VITAMIN D3) 50 MCG
TABLET ORAL
Qty: 90 TABLET | Refills: 3 | Status: SHIPPED | OUTPATIENT
Start: 2020-10-26

## 2021-07-28 DIAGNOSIS — R79.89 LOW SERUM T4 LEVEL: ICD-10-CM

## 2021-07-28 DIAGNOSIS — E04.9 GOITER, NODULAR: ICD-10-CM

## 2021-07-28 RX ORDER — LEVOTHYROXINE SODIUM 0.03 MG/1
TABLET ORAL
Qty: 90 TABLET | Refills: 2 | Status: SHIPPED | OUTPATIENT
Start: 2021-07-28

## 2021-07-28 NOTE — TELEPHONE ENCOUNTER
This medication refill is regarding a electronic request by [facility].    Requested Prescriptions     Pending Prescriptions Disp Refills    levothyroxine (SYNTHROID) 25 MCG tablet [Pharmacy Med Name: LEVOTHYROXINE 25 MCG TABLET] 90 tablet 2     Sig: TAKE 1 TABLET BY MOUTH EVERY DAY       Date of last visit: 7/21/2020   Date of next visit: None  Date of last refill: 6/29/2020  90/3      Last Thyroid:    Lab Results   Component Value Date    TSH 3.720 07/21/2020    T4FREE 1.09 07/21/2020       Rx verified, ordered and set to EP.